# Patient Record
Sex: FEMALE | Race: WHITE
[De-identification: names, ages, dates, MRNs, and addresses within clinical notes are randomized per-mention and may not be internally consistent; named-entity substitution may affect disease eponyms.]

---

## 2018-01-15 ENCOUNTER — HOSPITAL ENCOUNTER (OUTPATIENT)
Dept: HOSPITAL 94 - GI LAB | Age: 43
Discharge: HOME | End: 2018-01-15
Attending: INTERNAL MEDICINE
Payer: MEDICAID

## 2018-01-15 VITALS — DIASTOLIC BLOOD PRESSURE: 105 MMHG | SYSTOLIC BLOOD PRESSURE: 143 MMHG

## 2018-01-15 VITALS — DIASTOLIC BLOOD PRESSURE: 94 MMHG | SYSTOLIC BLOOD PRESSURE: 138 MMHG

## 2018-01-15 VITALS — DIASTOLIC BLOOD PRESSURE: 84 MMHG | SYSTOLIC BLOOD PRESSURE: 147 MMHG

## 2018-01-15 VITALS — HEIGHT: 69 IN | WEIGHT: 293 LBS | BODY MASS INDEX: 43.4 KG/M2

## 2018-01-15 VITALS — DIASTOLIC BLOOD PRESSURE: 75 MMHG | SYSTOLIC BLOOD PRESSURE: 148 MMHG

## 2018-01-15 VITALS — DIASTOLIC BLOOD PRESSURE: 75 MMHG | SYSTOLIC BLOOD PRESSURE: 135 MMHG

## 2018-01-15 DIAGNOSIS — E66.01: Primary | ICD-10-CM

## 2018-01-15 DIAGNOSIS — Z79.899: ICD-10-CM

## 2018-01-15 DIAGNOSIS — K29.70: ICD-10-CM

## 2018-01-15 DIAGNOSIS — K20.9: ICD-10-CM

## 2018-01-15 PROCEDURE — 99152 MOD SED SAME PHYS/QHP 5/>YRS: CPT

## 2018-01-15 PROCEDURE — 43239 EGD BIOPSY SINGLE/MULTIPLE: CPT

## 2019-01-02 ENCOUNTER — HOSPITAL ENCOUNTER (EMERGENCY)
Dept: HOSPITAL 94 - ER | Age: 44
Discharge: HOME | End: 2019-01-02
Payer: MEDICAID

## 2019-01-02 VITALS — HEIGHT: 68 IN | WEIGHT: 293 LBS | BODY MASS INDEX: 44.41 KG/M2

## 2019-01-02 VITALS — SYSTOLIC BLOOD PRESSURE: 155 MMHG | DIASTOLIC BLOOD PRESSURE: 102 MMHG

## 2019-01-02 DIAGNOSIS — Z79.899: ICD-10-CM

## 2019-01-02 DIAGNOSIS — J06.9: Primary | ICD-10-CM

## 2019-01-02 DIAGNOSIS — G89.29: ICD-10-CM

## 2019-01-02 LAB
ALBUMIN SERPL BCP-MCNC: 3.6 G/DL (ref 3.4–5)
ALBUMIN/GLOB SERPL: 0.8 {RATIO} (ref 1.1–1.5)
ALP SERPL-CCNC: 91 IU/L (ref 46–116)
ALT SERPL W P-5'-P-CCNC: 26 U/L (ref 12–78)
ANION GAP SERPL CALCULATED.3IONS-SCNC: 12 MMOL/L (ref 8–16)
ANISOCYTOSIS BLD QL SMEAR: (no result)
AST SERPL W P-5'-P-CCNC: 17 U/L (ref 10–37)
BASOPHILS # BLD AUTO: 0.1 X10'3 (ref 0–0.2)
BASOPHILS NFR BLD AUTO: 0.5 % (ref 0–1)
BILIRUB SERPL-MCNC: 0.3 MG/DL (ref 0.1–1)
BUN SERPL-MCNC: 12 MG/DL (ref 7–18)
BUN/CREAT SERPL: 13.5 (ref 6.6–38)
CALCIUM SERPL-MCNC: 8.8 MG/DL (ref 8.5–10.1)
CHLORIDE SERPL-SCNC: 101 MMOL/L (ref 99–107)
CO2 SERPL-SCNC: 25.3 MMOL/L (ref 24–32)
CREAT SERPL-MCNC: 0.89 MG/DL (ref 0.4–0.9)
DACRYOCYTES BLD QL SMEAR: (no result)
EOSINOPHIL # BLD AUTO: 0.2 X10'3 (ref 0–0.9)
EOSINOPHIL NFR BLD AUTO: 2 % (ref 0–6)
ERYTHROCYTE [DISTWIDTH] IN BLOOD BY AUTOMATED COUNT: 15.6 % (ref 11.5–14.5)
GFR SERPL CREATININE-BSD FRML MDRD: 70 ML/MIN
GLUCOSE SERPL-MCNC: 117 MG/DL (ref 70–104)
HCT VFR BLD AUTO: 34.6 % (ref 35–45)
HGB BLD-MCNC: 10.7 G/DL (ref 12–16)
LYMPHOCYTES # BLD AUTO: 2.7 X10'3 (ref 1.1–4.8)
LYMPHOCYTES NFR BLD AUTO: 26.1 % (ref 21–51)
MCH RBC QN AUTO: 21.3 PG (ref 27–31)
MCHC RBC AUTO-ENTMCNC: 31 % (ref 33–36.5)
MCV RBC AUTO: 68.8 FL (ref 78–98)
MICROCYTES BLD QL SMEAR: (no result)
MONOCYTES # BLD AUTO: 0.7 X10'3 (ref 0–0.9)
MONOCYTES NFR BLD AUTO: 6.9 % (ref 2–12)
NEUTROPHILS # BLD AUTO: 6.7 X10'3 (ref 1.8–7.7)
NEUTROPHILS NFR BLD AUTO: 64.5 % (ref 42–75)
OVALOCYTES BLD QL SMEAR: (no result)
PLATELET # BLD AUTO: 261 X10'3 (ref 140–440)
PLATELET BLD QL SMEAR: NORMAL
PMV BLD AUTO: 8.7 FL (ref 7.4–10.4)
POLYCHROMASIA BLD QL SMEAR: (no result)
POTASSIUM SERPL-SCNC: 3.9 MMOL/L (ref 3.5–5.1)
PROT SERPL-MCNC: 8.1 G/DL (ref 6.4–8.2)
RBC # BLD AUTO: 5.03 X10'6 (ref 4.2–5.6)
RBC MORPH BLD: (no result)
SCHISTOCYTES BLD QL SMEAR: (no result)
SODIUM SERPL-SCNC: 138 MMOL/L (ref 135–145)
WBC # BLD AUTO: 10.4 X10'3 (ref 4.5–11)

## 2019-01-02 PROCEDURE — 99284 EMERGENCY DEPT VISIT MOD MDM: CPT

## 2019-01-02 PROCEDURE — 87040 BLOOD CULTURE FOR BACTERIA: CPT

## 2019-01-02 PROCEDURE — 83605 ASSAY OF LACTIC ACID: CPT

## 2019-01-02 PROCEDURE — 80053 COMPREHEN METABOLIC PANEL: CPT

## 2019-01-02 PROCEDURE — 87502 INFLUENZA DNA AMP PROBE: CPT

## 2019-01-02 PROCEDURE — 36415 COLL VENOUS BLD VENIPUNCTURE: CPT

## 2019-01-02 PROCEDURE — 85025 COMPLETE CBC W/AUTO DIFF WBC: CPT

## 2019-01-02 PROCEDURE — 87503 INFLUENZA DNA AMP PROB ADDL: CPT

## 2019-01-02 PROCEDURE — 71046 X-RAY EXAM CHEST 2 VIEWS: CPT

## 2019-01-02 PROCEDURE — 93005 ELECTROCARDIOGRAM TRACING: CPT

## 2019-01-06 ENCOUNTER — HOSPITAL ENCOUNTER (INPATIENT)
Dept: HOSPITAL 94 - ER | Age: 44
LOS: 3 days | Discharge: HOME | End: 2019-01-09
Payer: MEDICAID

## 2019-08-13 ENCOUNTER — HOSPITAL ENCOUNTER (OUTPATIENT)
Dept: HOSPITAL 94 - PRE-OP | Age: 44
Discharge: HOME | End: 2019-08-13
Attending: SPECIALIST
Payer: MEDICAID

## 2019-08-13 DIAGNOSIS — Z01.818: Primary | ICD-10-CM

## 2019-08-13 DIAGNOSIS — D25.9: ICD-10-CM

## 2019-08-13 DIAGNOSIS — I25.2: ICD-10-CM

## 2019-08-13 PROCEDURE — 71046 X-RAY EXAM CHEST 2 VIEWS: CPT

## 2019-08-13 PROCEDURE — 93005 ELECTROCARDIOGRAM TRACING: CPT

## 2019-08-30 ENCOUNTER — HOSPITAL ENCOUNTER (OUTPATIENT)
Dept: HOSPITAL 94 - SSTAY O | Age: 44
Discharge: HOME | End: 2019-08-30
Attending: INTERNAL MEDICINE
Payer: MEDICAID

## 2019-08-30 VITALS — DIASTOLIC BLOOD PRESSURE: 67 MMHG | SYSTOLIC BLOOD PRESSURE: 139 MMHG

## 2019-08-30 VITALS — BODY MASS INDEX: 42.12 KG/M2 | HEIGHT: 69 IN | WEIGHT: 284.4 LBS

## 2019-08-30 VITALS — SYSTOLIC BLOOD PRESSURE: 161 MMHG | DIASTOLIC BLOOD PRESSURE: 76 MMHG

## 2019-08-30 VITALS — SYSTOLIC BLOOD PRESSURE: 168 MMHG | DIASTOLIC BLOOD PRESSURE: 97 MMHG

## 2019-08-30 VITALS — SYSTOLIC BLOOD PRESSURE: 136 MMHG | DIASTOLIC BLOOD PRESSURE: 77 MMHG

## 2019-08-30 VITALS — SYSTOLIC BLOOD PRESSURE: 146 MMHG | DIASTOLIC BLOOD PRESSURE: 52 MMHG

## 2019-08-30 VITALS — SYSTOLIC BLOOD PRESSURE: 152 MMHG | DIASTOLIC BLOOD PRESSURE: 81 MMHG

## 2019-08-30 VITALS — DIASTOLIC BLOOD PRESSURE: 97 MMHG | SYSTOLIC BLOOD PRESSURE: 172 MMHG

## 2019-08-30 VITALS — SYSTOLIC BLOOD PRESSURE: 152 MMHG | DIASTOLIC BLOOD PRESSURE: 99 MMHG

## 2019-08-30 VITALS — SYSTOLIC BLOOD PRESSURE: 171 MMHG | DIASTOLIC BLOOD PRESSURE: 95 MMHG

## 2019-08-30 DIAGNOSIS — F41.9: ICD-10-CM

## 2019-08-30 DIAGNOSIS — M19.90: ICD-10-CM

## 2019-08-30 DIAGNOSIS — Z82.49: ICD-10-CM

## 2019-08-30 DIAGNOSIS — Z87.11: ICD-10-CM

## 2019-08-30 DIAGNOSIS — Z98.890: ICD-10-CM

## 2019-08-30 DIAGNOSIS — Z79.899: ICD-10-CM

## 2019-08-30 DIAGNOSIS — E66.01: ICD-10-CM

## 2019-08-30 DIAGNOSIS — Z86.711: ICD-10-CM

## 2019-08-30 DIAGNOSIS — I25.10: Primary | ICD-10-CM

## 2019-08-30 DIAGNOSIS — D50.9: ICD-10-CM

## 2019-08-30 DIAGNOSIS — F32.9: ICD-10-CM

## 2019-08-30 LAB
ALBUMIN SERPL BCP-MCNC: 3.8 G/DL (ref 3.4–5)
ANION GAP SERPL CALCULATED.3IONS-SCNC: 9 MMOL/L (ref 8–16)
ANISOCYTOSIS BLD QL SMEAR: (no result)
BASOPHILS # BLD AUTO: 0.1 X10'3 (ref 0–0.2)
BASOPHILS NFR BLD AUTO: 0.5 % (ref 0–1)
BUN SERPL-MCNC: 10 MG/DL (ref 7–18)
BUN/CREAT SERPL: 12.5 (ref 6.6–38)
CALCIUM SERPL-MCNC: 9.2 MG/DL (ref 8.5–10.1)
CHLORIDE SERPL-SCNC: 105 MMOL/L (ref 99–107)
CO2 SERPL-SCNC: 26.7 MMOL/L (ref 24–32)
CREAT SERPL-MCNC: 0.8 MG/DL (ref 0.4–0.9)
EOSINOPHIL # BLD AUTO: 0.1 X10'3 (ref 0–0.9)
EOSINOPHIL NFR BLD AUTO: 1.1 % (ref 0–6)
ERYTHROCYTE [DISTWIDTH] IN BLOOD BY AUTOMATED COUNT: 18.1 % (ref 11.5–14.5)
GFR SERPL CREATININE-BSD FRML MDRD: 78 ML/MIN
GLUCOSE SERPL-MCNC: 92 MG/DL (ref 70–104)
HCT VFR BLD AUTO: 31.3 % (ref 35–45)
HGB BLD-MCNC: 9.3 G/DL (ref 12–16)
HYPOCHROMIA BLD QL SMEAR: (no result)
LYMPHOCYTES # BLD AUTO: 3 X10'3 (ref 1.1–4.8)
LYMPHOCYTES NFR BLD AUTO: 27.9 % (ref 21–51)
MAGNESIUM SERPL-MCNC: 2 MG/DL (ref 1.5–2.4)
MCH RBC QN AUTO: 19.2 PG (ref 27–31)
MCHC RBC AUTO-ENTMCNC: 29.8 G/DL (ref 33–36.5)
MCV RBC AUTO: 64.4 FL (ref 78–98)
MICROCYTES BLD QL SMEAR: (no result)
MONOCYTES # BLD AUTO: 0.9 X10'3 (ref 0–0.9)
MONOCYTES NFR BLD AUTO: 8 % (ref 2–12)
NEUTROPHILS # BLD AUTO: 6.7 X10'3 (ref 1.8–7.7)
NEUTROPHILS NFR BLD AUTO: 62.5 % (ref 42–75)
PLATELET # BLD AUTO: 331 X10'3 (ref 140–440)
PLATELET BLD QL SMEAR: NORMAL
PMV BLD AUTO: 8.4 FL (ref 7.4–10.4)
POTASSIUM SERPL-SCNC: 4.2 MMOL/L (ref 3.5–5.1)
RBC # BLD AUTO: 4.86 X10'6 (ref 4.2–5.6)
RBC MORPH BLD: (no result)
SCHISTOCYTES BLD QL SMEAR: (no result)
SODIUM SERPL-SCNC: 141 MMOL/L (ref 135–145)
WBC # BLD AUTO: 10.7 X10'3 (ref 4.5–11)

## 2019-08-30 PROCEDURE — 93458 L HRT ARTERY/VENTRICLE ANGIO: CPT

## 2019-08-30 PROCEDURE — 80048 BASIC METABOLIC PNL TOTAL CA: CPT

## 2019-08-30 PROCEDURE — 83735 ASSAY OF MAGNESIUM: CPT

## 2019-08-30 PROCEDURE — 99153 MOD SED SAME PHYS/QHP EA: CPT

## 2019-08-30 PROCEDURE — 93005 ELECTROCARDIOGRAM TRACING: CPT

## 2019-08-30 PROCEDURE — 85025 COMPLETE CBC W/AUTO DIFF WBC: CPT

## 2019-08-30 PROCEDURE — 99152 MOD SED SAME PHYS/QHP 5/>YRS: CPT

## 2019-08-30 PROCEDURE — 85610 PROTHROMBIN TIME: CPT

## 2019-08-30 PROCEDURE — 36415 COLL VENOUS BLD VENIPUNCTURE: CPT

## 2019-08-31 ENCOUNTER — HOSPITAL ENCOUNTER (INPATIENT)
Dept: HOSPITAL 94 - ER | Age: 44
LOS: 1 days | DRG: 190 | End: 2019-09-01
Attending: INTERNAL MEDICINE | Admitting: INTERNAL MEDICINE
Payer: MEDICAID

## 2019-08-31 VITALS — SYSTOLIC BLOOD PRESSURE: 102 MMHG | DIASTOLIC BLOOD PRESSURE: 58 MMHG

## 2019-08-31 VITALS — DIASTOLIC BLOOD PRESSURE: 51 MMHG | SYSTOLIC BLOOD PRESSURE: 98 MMHG

## 2019-08-31 VITALS — DIASTOLIC BLOOD PRESSURE: 55 MMHG | SYSTOLIC BLOOD PRESSURE: 104 MMHG

## 2019-08-31 VITALS — SYSTOLIC BLOOD PRESSURE: 111 MMHG | DIASTOLIC BLOOD PRESSURE: 57 MMHG

## 2019-08-31 VITALS — DIASTOLIC BLOOD PRESSURE: 44 MMHG | SYSTOLIC BLOOD PRESSURE: 73 MMHG

## 2019-08-31 VITALS — DIASTOLIC BLOOD PRESSURE: 53 MMHG | SYSTOLIC BLOOD PRESSURE: 99 MMHG

## 2019-08-31 VITALS — BODY MASS INDEX: 47.09 KG/M2 | HEIGHT: 66 IN | WEIGHT: 293 LBS

## 2019-08-31 VITALS — SYSTOLIC BLOOD PRESSURE: 99 MMHG | DIASTOLIC BLOOD PRESSURE: 53 MMHG

## 2019-08-31 VITALS — SYSTOLIC BLOOD PRESSURE: 107 MMHG | DIASTOLIC BLOOD PRESSURE: 56 MMHG

## 2019-08-31 VITALS — DIASTOLIC BLOOD PRESSURE: 55 MMHG | SYSTOLIC BLOOD PRESSURE: 106 MMHG

## 2019-08-31 VITALS — SYSTOLIC BLOOD PRESSURE: 82 MMHG | DIASTOLIC BLOOD PRESSURE: 41 MMHG

## 2019-08-31 VITALS — SYSTOLIC BLOOD PRESSURE: 112 MMHG | DIASTOLIC BLOOD PRESSURE: 59 MMHG

## 2019-08-31 VITALS — SYSTOLIC BLOOD PRESSURE: 96 MMHG | DIASTOLIC BLOOD PRESSURE: 47 MMHG

## 2019-08-31 VITALS — DIASTOLIC BLOOD PRESSURE: 54 MMHG | SYSTOLIC BLOOD PRESSURE: 106 MMHG

## 2019-08-31 VITALS — SYSTOLIC BLOOD PRESSURE: 90 MMHG | DIASTOLIC BLOOD PRESSURE: 52 MMHG

## 2019-08-31 VITALS — DIASTOLIC BLOOD PRESSURE: 48 MMHG | SYSTOLIC BLOOD PRESSURE: 82 MMHG

## 2019-08-31 VITALS — DIASTOLIC BLOOD PRESSURE: 67 MMHG | SYSTOLIC BLOOD PRESSURE: 114 MMHG

## 2019-08-31 VITALS — SYSTOLIC BLOOD PRESSURE: 93 MMHG | DIASTOLIC BLOOD PRESSURE: 49 MMHG

## 2019-08-31 VITALS — DIASTOLIC BLOOD PRESSURE: 54 MMHG | SYSTOLIC BLOOD PRESSURE: 104 MMHG

## 2019-08-31 VITALS — DIASTOLIC BLOOD PRESSURE: 53 MMHG | SYSTOLIC BLOOD PRESSURE: 97 MMHG

## 2019-08-31 VITALS — SYSTOLIC BLOOD PRESSURE: 103 MMHG | DIASTOLIC BLOOD PRESSURE: 53 MMHG

## 2019-08-31 VITALS — SYSTOLIC BLOOD PRESSURE: 106 MMHG | DIASTOLIC BLOOD PRESSURE: 56 MMHG

## 2019-08-31 VITALS — DIASTOLIC BLOOD PRESSURE: 45 MMHG | SYSTOLIC BLOOD PRESSURE: 80 MMHG

## 2019-08-31 VITALS — DIASTOLIC BLOOD PRESSURE: 53 MMHG | SYSTOLIC BLOOD PRESSURE: 80 MMHG

## 2019-08-31 DIAGNOSIS — M54.9: ICD-10-CM

## 2019-08-31 DIAGNOSIS — Z79.899: ICD-10-CM

## 2019-08-31 DIAGNOSIS — J81.0: ICD-10-CM

## 2019-08-31 DIAGNOSIS — R29.898: ICD-10-CM

## 2019-08-31 DIAGNOSIS — G89.4: ICD-10-CM

## 2019-08-31 DIAGNOSIS — I20.1: ICD-10-CM

## 2019-08-31 DIAGNOSIS — F41.9: ICD-10-CM

## 2019-08-31 DIAGNOSIS — I42.8: ICD-10-CM

## 2019-08-31 DIAGNOSIS — I21.3: Primary | ICD-10-CM

## 2019-08-31 DIAGNOSIS — Z86.711: ICD-10-CM

## 2019-08-31 DIAGNOSIS — N17.9: ICD-10-CM

## 2019-08-31 DIAGNOSIS — J96.00: ICD-10-CM

## 2019-08-31 DIAGNOSIS — I51.81: ICD-10-CM

## 2019-08-31 DIAGNOSIS — E66.01: ICD-10-CM

## 2019-08-31 DIAGNOSIS — R57.0: ICD-10-CM

## 2019-08-31 LAB
ALBUMIN SERPL BCP-MCNC: 3 G/DL (ref 3.4–5)
ALBUMIN SERPL BCP-MCNC: 3 G/DL (ref 3.4–5)
ALBUMIN SERPL BCP-MCNC: 3.4 G/DL (ref 3.4–5)
ALBUMIN/GLOB SERPL: 0.7 {RATIO} (ref 1.1–1.5)
ALBUMIN/GLOB SERPL: 0.8 {RATIO} (ref 1.1–1.5)
ALBUMIN/GLOB SERPL: 0.8 {RATIO} (ref 1.1–1.5)
ALP SERPL-CCNC: 67 IU/L (ref 46–116)
ALP SERPL-CCNC: 73 IU/L (ref 46–116)
ALP SERPL-CCNC: 86 IU/L (ref 46–116)
ALT SERPL W P-5'-P-CCNC: 25 U/L (ref 12–78)
ALT SERPL W P-5'-P-CCNC: 44 U/L (ref 12–78)
ALT SERPL W P-5'-P-CCNC: 68 U/L (ref 12–78)
ANION GAP SERPL CALCULATED.3IONS-SCNC: 11 MMOL/L (ref 8–16)
ANION GAP SERPL CALCULATED.3IONS-SCNC: 13 MMOL/L (ref 8–16)
ANION GAP SERPL CALCULATED.3IONS-SCNC: 18 MMOL/L (ref 8–16)
ANISOCYTOSIS BLD QL SMEAR: (no result)
ANISOCYTOSIS BLD QL SMEAR: (no result)
APTT PPP: 24 SECONDS (ref 22–32)
APTT PPP: 27 SECONDS (ref 22–32)
AST SERPL W P-5'-P-CCNC: 12 U/L (ref 10–37)
AST SERPL W P-5'-P-CCNC: 169 U/L (ref 10–37)
AST SERPL W P-5'-P-CCNC: 496 U/L (ref 10–37)
BASE EXCESS BLDA CALC-SCNC: -13.6 MMOL/L (ref -2–3)
BASE EXCESS BLDA CALC-SCNC: -6.4 MMOL/L (ref -2–3)
BASE EXCESS BLDA CALC-SCNC: -9.9 MMOL/L (ref -2–3)
BASOPHILS # BLD AUTO: 0 X10'3 (ref 0–0.2)
BASOPHILS # BLD AUTO: 0 X10'3 (ref 0–0.2)
BASOPHILS # BLD AUTO: 0.1 X10'3 (ref 0–0.2)
BASOPHILS NFR BLD AUTO: 0.1 % (ref 0–1)
BASOPHILS NFR BLD AUTO: 0.2 % (ref 0–1)
BASOPHILS NFR BLD AUTO: 0.7 % (ref 0–1)
BILIRUB SERPL-MCNC: 0.4 MG/DL (ref 0.1–1)
BODY TEMPERATURE: 37
BODY TEMPERATURE: 37
BODY TEMPERATURE: 37.4
BUN SERPL-MCNC: 11 MG/DL (ref 7–18)
BUN SERPL-MCNC: 14 MG/DL (ref 7–18)
BUN SERPL-MCNC: 17 MG/DL (ref 7–18)
BUN/CREAT SERPL: 10.6 (ref 6.6–38)
BUN/CREAT SERPL: 7.2 (ref 6.6–38)
BUN/CREAT SERPL: 7.9 (ref 6.6–38)
CALCIUM SERPL-MCNC: 7.9 MG/DL (ref 8.5–10.1)
CALCIUM SERPL-MCNC: 8.3 MG/DL (ref 8.5–10.1)
CALCIUM SERPL-MCNC: 8.8 MG/DL (ref 8.5–10.1)
CHLORIDE SERPL-SCNC: 102 MMOL/L (ref 99–107)
CHLORIDE SERPL-SCNC: 104 MMOL/L (ref 99–107)
CHLORIDE SERPL-SCNC: 104 MMOL/L (ref 99–107)
CO2 SERPL-SCNC: 15.9 MMOL/L (ref 24–32)
CO2 SERPL-SCNC: 23.6 MMOL/L (ref 24–32)
CO2 SERPL-SCNC: 23.7 MMOL/L (ref 24–32)
COHGB MFR BLDA: 0.2 % (ref 0.5–1.5)
COHGB MFR BLDA: 0.3 % (ref 0.5–1.5)
COHGB MFR BLDA: 0.3 % (ref 0.5–1.5)
CREAT SERPL-MCNC: 1.04 MG/DL (ref 0.4–0.9)
CREAT SERPL-MCNC: 1.77 MG/DL (ref 0.4–0.9)
CREAT SERPL-MCNC: 2.35 MG/DL (ref 0.4–0.9)
D DIMER PPP FEU-MCNC: 1.09 MG/L FEU (ref 0–0.5)
EOSINOPHIL # BLD AUTO: 0 X10'3 (ref 0–0.9)
EOSINOPHIL # BLD AUTO: 0 X10'3 (ref 0–0.9)
EOSINOPHIL # BLD AUTO: 0.3 X10'3 (ref 0–0.9)
EOSINOPHIL NFR BLD AUTO: 0 % (ref 0–6)
EOSINOPHIL NFR BLD AUTO: 0.1 % (ref 0–6)
EOSINOPHIL NFR BLD AUTO: 2.1 % (ref 0–6)
ERYTHROCYTE [DISTWIDTH] IN BLOOD BY AUTOMATED COUNT: 18.3 % (ref 11.5–14.5)
ERYTHROCYTE [DISTWIDTH] IN BLOOD BY AUTOMATED COUNT: 18.7 % (ref 11.5–14.5)
ERYTHROCYTE [DISTWIDTH] IN BLOOD BY AUTOMATED COUNT: 19.1 % (ref 11.5–14.5)
GFR SERPL CREATININE-BSD FRML MDRD: 23 ML/MIN
GFR SERPL CREATININE-BSD FRML MDRD: 31 ML/MIN
GFR SERPL CREATININE-BSD FRML MDRD: 58 ML/MIN
GLUCOSE SERPL-MCNC: 183 MG/DL (ref 70–104)
GLUCOSE SERPL-MCNC: 215 MG/DL (ref 70–104)
GLUCOSE SERPL-MCNC: 271 MG/DL (ref 70–104)
HCO3 BLDA-SCNC: 16.8 MMOL/L (ref 22–26)
HCO3 BLDA-SCNC: 17.6 MMOL/L (ref 22–26)
HCO3 BLDA-SCNC: 18.5 MMOL/L (ref 22–26)
HCT VFR BLD AUTO: 30.5 % (ref 35–45)
HCT VFR BLD AUTO: 30.8 % (ref 35–45)
HCT VFR BLD AUTO: 34.4 % (ref 35–45)
HGB BLD-MCNC: 8.9 G/DL (ref 12–16)
HGB BLD-MCNC: 9 G/DL (ref 12–16)
HGB BLD-MCNC: 9.9 G/DL (ref 12–16)
HGB BLDA-MCNC: 10.7 G/DL (ref 12–16)
HGB BLDA-MCNC: 11 G/DL (ref 12–16)
HGB BLDA-MCNC: 11.8 G/DL (ref 12–16)
HYPOCHROMIA BLD QL SMEAR: (no result)
INHALED O2 FLOW RATE: 15 L/MIN
LYMPHOCYTES # BLD AUTO: 1.5 X10'3 (ref 1.1–4.8)
LYMPHOCYTES # BLD AUTO: 1.9 X10'3 (ref 1.1–4.8)
LYMPHOCYTES # BLD AUTO: 6.5 X10'3 (ref 1.1–4.8)
LYMPHOCYTES NFR BLD AUTO: 50.5 % (ref 21–51)
LYMPHOCYTES NFR BLD AUTO: 6.2 % (ref 21–51)
LYMPHOCYTES NFR BLD AUTO: 9.2 % (ref 21–51)
MCH RBC QN AUTO: 18.8 PG (ref 27–31)
MCH RBC QN AUTO: 19 PG (ref 27–31)
MCH RBC QN AUTO: 19.2 PG (ref 27–31)
MCHC RBC AUTO-ENTMCNC: 28.7 G/DL (ref 33–36.5)
MCHC RBC AUTO-ENTMCNC: 28.9 G/DL (ref 33–36.5)
MCHC RBC AUTO-ENTMCNC: 29.5 G/DL (ref 33–36.5)
MCV RBC AUTO: 64.6 FL (ref 78–98)
MCV RBC AUTO: 65.2 FL (ref 78–98)
MCV RBC AUTO: 66.8 FL (ref 78–98)
METHGB MFR BLDA: 0.3 % (ref 0.3–1.12)
MICROCYTES BLD QL SMEAR: (no result)
MICROCYTES BLD QL SMEAR: (no result)
MONOCYTES # BLD AUTO: 1.1 X10'3 (ref 0–0.9)
MONOCYTES # BLD AUTO: 1.3 X10'3 (ref 0–0.9)
MONOCYTES # BLD AUTO: 1.5 X10'3 (ref 0–0.9)
MONOCYTES NFR BLD AUTO: 5.3 % (ref 2–12)
MONOCYTES NFR BLD AUTO: 6.9 % (ref 2–12)
MONOCYTES NFR BLD AUTO: 8.3 % (ref 2–12)
NEUTROPHILS # BLD AUTO: 17.6 X10'3 (ref 1.8–7.7)
NEUTROPHILS # BLD AUTO: 21.5 X10'3 (ref 1.8–7.7)
NEUTROPHILS # BLD AUTO: 5 X10'3 (ref 1.8–7.7)
NEUTROPHILS NFR BLD AUTO: 38.4 % (ref 42–75)
NEUTROPHILS NFR BLD AUTO: 83.6 % (ref 42–75)
NEUTROPHILS NFR BLD AUTO: 88.4 % (ref 42–75)
O2/TOTAL GAS SETTING VFR VENT: 100 MMHG/%
O2/TOTAL GAS SETTING VFR VENT: 100 MMHG/%
O2/TOTAL GAS SETTING VFR VENT: 50 MMHG/%
OXYHGB MFR BLDA: 92.1 % (ref 94–100)
OXYHGB MFR BLDA: 94.3 % (ref 94–100)
OXYHGB MFR BLDA: 94.9 % (ref 94–100)
PCO2 TEMP ADJ BLDA: 34.8 MMHG (ref 35–45)
PCO2 TEMP ADJ BLDA: 39.6 MMHG (ref 35–45)
PCO2 TEMP ADJ BLDA: 67.8 MMHG (ref 35–45)
PEEP RESPIRATORY: 5 CM H2O
PEEP RESPIRATORY: 5 CM H2O
PH TEMP ADJ BLDA: 7.03 [PH] (ref 7.35–7.45)
PH TEMP ADJ BLDA: 7.25 [PH] (ref 7.35–7.45)
PH TEMP ADJ BLDA: 7.34 [PH] (ref 7.35–7.45)
PLATELET # BLD AUTO: 287 X10'3 (ref 140–440)
PLATELET # BLD AUTO: 316 X10'3 (ref 140–440)
PLATELET # BLD AUTO: 334 X10'3 (ref 140–440)
PLATELET BLD QL SMEAR: NORMAL
PLATELET BLD QL SMEAR: NORMAL
PMV BLD AUTO: 7.9 FL (ref 7.4–10.4)
PMV BLD AUTO: 7.9 FL (ref 7.4–10.4)
PMV BLD AUTO: 8.9 FL (ref 7.4–10.4)
PO2 TEMP ADJ BLD: 83.8 MMHG (ref 83–108)
PO2 TEMP ADJ BLD: 86.8 MMHG (ref 83–108)
PO2 TEMP ADJ BLD: 97.8 MMHG (ref 83–108)
PO2 TEMP ADJ BLDMV: 30.5 MMHG (ref 35–46)
POIKILOCYTOSIS BLD QL SMEAR: (no result)
POLYCHROMASIA BLD QL SMEAR: (no result)
POTASSIUM SERPL-SCNC: 3.5 MMOL/L (ref 3.5–5.1)
POTASSIUM SERPL-SCNC: 4.3 MMOL/L (ref 3.5–5.1)
POTASSIUM SERPL-SCNC: 4.8 MMOL/L (ref 3.5–5.1)
PROT SERPL-MCNC: 6.8 G/DL (ref 6.4–8.2)
PROT SERPL-MCNC: 7.2 G/DL (ref 6.4–8.2)
PROT SERPL-MCNC: 7.5 G/DL (ref 6.4–8.2)
RBC # BLD AUTO: 4.72 X10'6 (ref 4.2–5.6)
RBC # BLD AUTO: 4.73 X10'6 (ref 4.2–5.6)
RBC # BLD AUTO: 5.15 X10'6 (ref 4.2–5.6)
RBC MORPH BLD: (no result)
RBC MORPH BLD: (no result)
RESP RATE 1H: 20 B/MIN
RESP RATE 1H: 20 B/MIN
RESP RATE RESTING: 30 B/MIN
RESP RATE RESTING: 39 B/MIN
SAO2 % BLDA: 92.7 % (ref 95–98)
SAO2 % BLDA: 94.9 % (ref 95–98)
SAO2 % BLDA: 95.4 % (ref 95–98)
SAO2 % BLDMV: 45.8 % (ref 60–80)
SODIUM SERPL-SCNC: 138 MMOL/L (ref 135–145)
SODIUM SERPL-SCNC: 139 MMOL/L (ref 135–145)
SODIUM SERPL-SCNC: 139 MMOL/L (ref 135–145)
SPONT VT COMPRES GAS VOL SET UNCOR VENT: 350 ML
SPONT VT COMPRES GAS VOL SET UNCOR VENT: 484 ML
TROPONIN I SERPL-MCNC: 7.99 NG/ML (ref 0–0.05)
VOL.EXP/M/BW ON VENT: 15 L/MIN
VOL.EXP/M/BW ON VENT: 20 L/MIN
WBC # BLD AUTO: 13 X10'3 (ref 4.5–11)
WBC # BLD AUTO: 21.1 X10'3 (ref 4.5–11)
WBC # BLD AUTO: 24.3 X10'3 (ref 4.5–11)

## 2019-08-31 PROCEDURE — 85025 COMPLETE CBC W/AUTO DIFF WBC: CPT

## 2019-08-31 PROCEDURE — 85730 THROMBOPLASTIN TIME PARTIAL: CPT

## 2019-08-31 PROCEDURE — 71045 X-RAY EXAM CHEST 1 VIEW: CPT

## 2019-08-31 PROCEDURE — 96375 TX/PRO/DX INJ NEW DRUG ADDON: CPT

## 2019-08-31 PROCEDURE — 36600 WITHDRAWAL OF ARTERIAL BLOOD: CPT

## 2019-08-31 PROCEDURE — 93458 L HRT ARTERY/VENTRICLE ANGIO: CPT

## 2019-08-31 PROCEDURE — 94760 N-INVAS EAR/PLS OXIMETRY 1: CPT

## 2019-08-31 PROCEDURE — 0BH17EZ INSERTION OF ENDOTRACHEAL AIRWAY INTO TRACHEA, VIA NATURAL OR ARTIFICIAL OPENING: ICD-10-PCS | Performed by: INTERNAL MEDICINE

## 2019-08-31 PROCEDURE — 83880 ASSAY OF NATRIURETIC PEPTIDE: CPT

## 2019-08-31 PROCEDURE — 99291 CRITICAL CARE FIRST HOUR: CPT

## 2019-08-31 PROCEDURE — 94640 AIRWAY INHALATION TREATMENT: CPT

## 2019-08-31 PROCEDURE — 06HY33Z INSERTION OF INFUSION DEVICE INTO LOWER VEIN, PERCUTANEOUS APPROACH: ICD-10-PCS | Performed by: INTERNAL MEDICINE

## 2019-08-31 PROCEDURE — 36415 COLL VENOUS BLD VENIPUNCTURE: CPT

## 2019-08-31 PROCEDURE — 82330 ASSAY OF CALCIUM: CPT

## 2019-08-31 PROCEDURE — B2111ZZ FLUOROSCOPY OF MULTIPLE CORONARY ARTERIES USING LOW OSMOLAR CONTRAST: ICD-10-PCS | Performed by: INTERNAL MEDICINE

## 2019-08-31 PROCEDURE — 85018 HEMOGLOBIN: CPT

## 2019-08-31 PROCEDURE — B32T1ZZ COMPUTERIZED TOMOGRAPHY (CT SCAN) OF LEFT PULMONARY ARTERY USING LOW OSMOLAR CONTRAST: ICD-10-PCS

## 2019-08-31 PROCEDURE — B32S1ZZ COMPUTERIZED TOMOGRAPHY (CT SCAN) OF RIGHT PULMONARY ARTERY USING LOW OSMOLAR CONTRAST: ICD-10-PCS

## 2019-08-31 PROCEDURE — 99152 MOD SED SAME PHYS/QHP 5/>YRS: CPT

## 2019-08-31 PROCEDURE — B2151ZZ FLUOROSCOPY OF LEFT HEART USING LOW OSMOLAR CONTRAST: ICD-10-PCS | Performed by: INTERNAL MEDICINE

## 2019-08-31 PROCEDURE — 82803 BLOOD GASES ANY COMBINATION: CPT

## 2019-08-31 PROCEDURE — 93005 ELECTROCARDIOGRAM TRACING: CPT

## 2019-08-31 PROCEDURE — 82810 BLOOD GASES O2 SAT ONLY: CPT

## 2019-08-31 PROCEDURE — 96376 TX/PRO/DX INJ SAME DRUG ADON: CPT

## 2019-08-31 PROCEDURE — 80053 COMPREHEN METABOLIC PANEL: CPT

## 2019-08-31 PROCEDURE — 71275 CT ANGIOGRAPHY CHEST: CPT

## 2019-08-31 PROCEDURE — 04HY32Z INSERTION OF MONITORING DEVICE INTO LOWER ARTERY, PERCUTANEOUS APPROACH: ICD-10-PCS | Performed by: INTERNAL MEDICINE

## 2019-08-31 PROCEDURE — 85379 FIBRIN DEGRADATION QUANT: CPT

## 2019-08-31 PROCEDURE — 94002 VENT MGMT INPAT INIT DAY: CPT

## 2019-08-31 PROCEDURE — 84484 ASSAY OF TROPONIN QUANT: CPT

## 2019-08-31 PROCEDURE — 92950 HEART/LUNG RESUSCITATION CPR: CPT

## 2019-08-31 PROCEDURE — 5A1935Z RESPIRATORY VENTILATION, LESS THAN 24 CONSECUTIVE HOURS: ICD-10-PCS | Performed by: INTERNAL MEDICINE

## 2019-08-31 PROCEDURE — B3201ZZ COMPUTERIZED TOMOGRAPHY (CT SCAN) OF THORACIC AORTA USING LOW OSMOLAR CONTRAST: ICD-10-PCS

## 2019-08-31 PROCEDURE — 5A12012 PERFORMANCE OF CARDIAC OUTPUT, SINGLE, MANUAL: ICD-10-PCS | Performed by: INTERNAL MEDICINE

## 2019-08-31 PROCEDURE — 99153 MOD SED SAME PHYS/QHP EA: CPT

## 2019-08-31 PROCEDURE — 99292 CRITICAL CARE ADDL 30 MIN: CPT

## 2019-08-31 PROCEDURE — 85610 PROTHROMBIN TIME: CPT

## 2019-08-31 PROCEDURE — 82948 REAGENT STRIP/BLOOD GLUCOSE: CPT

## 2019-08-31 PROCEDURE — 83605 ASSAY OF LACTIC ACID: CPT

## 2019-08-31 PROCEDURE — 31500 INSERT EMERGENCY AIRWAY: CPT

## 2019-08-31 PROCEDURE — 96365 THER/PROPH/DIAG IV INF INIT: CPT

## 2019-08-31 PROCEDURE — 4A023N7 MEASUREMENT OF CARDIAC SAMPLING AND PRESSURE, LEFT HEART, PERCUTANEOUS APPROACH: ICD-10-PCS | Performed by: INTERNAL MEDICINE

## 2019-08-31 RX ADMIN — SODIUM CHLORIDE SCH MLS/HR: 9 INJECTION INTRAMUSCULAR; INTRAVENOUS; SUBCUTANEOUS at 22:11

## 2019-08-31 RX ADMIN — DOBUTAMINE IN DEXTROSE SCH MLS/HR: 200 INJECTION, SOLUTION INTRAVENOUS at 23:33

## 2019-08-31 RX ADMIN — SODIUM CHLORIDE SCH MLS/HR: 9 INJECTION INTRAMUSCULAR; INTRAVENOUS; SUBCUTANEOUS at 16:42

## 2019-08-31 RX ADMIN — IPRATROPIUM BROMIDE AND ALBUTEROL SULFATE SCH ML: .5; 3 SOLUTION RESPIRATORY (INHALATION) at 11:00

## 2019-08-31 RX ADMIN — Medication PRN MLS/HR: at 19:06

## 2019-08-31 RX ADMIN — IPRATROPIUM BROMIDE AND ALBUTEROL SULFATE SCH ML: .5; 3 SOLUTION RESPIRATORY (INHALATION) at 18:31

## 2019-08-31 RX ADMIN — IPRATROPIUM BROMIDE AND ALBUTEROL SULFATE SCH ML: .5; 3 SOLUTION RESPIRATORY (INHALATION) at 22:40

## 2019-08-31 RX ADMIN — DOBUTAMINE IN DEXTROSE SCH MLS/HR: 200 INJECTION, SOLUTION INTRAVENOUS at 20:45

## 2019-08-31 RX ADMIN — DOBUTAMINE IN DEXTROSE SCH MLS/HR: 200 INJECTION, SOLUTION INTRAVENOUS at 16:10

## 2019-08-31 NOTE — NUR
Etomidate 20mg in at 0911. Succs 100mg in at 0912. intubation started. MD is 
using the Bougie. pt is a difficult intubation. size 8 tube in.

## 2019-08-31 NOTE — NUR
pt receiving another bolus of 20mg of propofol per MD Pick.

-------------------------------------------------------------------------------

Addendum: 08/31/19 at 0956 by RWILCOX

-------------------------------------------------------------------------------

pt is still somewhat restless and not tolerating the ventilator. the RT is at 
bedside hand baging the pt.

## 2019-08-31 NOTE — NUR
PT HAS A PULSE. LOOKS LIKE A SLOW VENTRICULAR RHYTHM. TOLD DRIPS HAD STOPPED 
DURING CPR. NO MOVEMENT WITH PT NOTED AFTER CPR. BEFORE THE PT CODED THE PT WAS 
BREATHING OVER THE VENT FASTER THAN WOULD ALLOW HER TO GET GOOD BREATHS. TV WAS 
200 AND RR WAS 44. RT WAS AT BEDSIDE MANUALLY BAGGING HER. WE WERE TRYING TO 
GET HER FULLY SEDATED.

## 2019-08-31 NOTE — NUR
pt is in respiratory distress. now spitting up and O2 sat is dropping out. HR 
is 131. and O2 sat is  on a Non-rebreather. pt is in distress. this started 
after the CTA of her chest on the way back to the room. MD Laurent at bedside. 
RT at bedside. pt is being suctioned.

## 2019-08-31 NOTE — NUR
Patient in room CICU 2014. I have received report from Tam STOREY and had the opportunity to 
ask questions and assume patient care.

## 2019-08-31 NOTE — NUR
received from cath lab via bed, bagged via  % O2, monitor and RN in attendance, 
placed on ventilator, and bedside monitor.  EKG shows elevated ST in multiple leads and 
reciprocal depressions, see EKG for details.  

1300 vomited moderate amt of clear greenish stomach contents, suctioned and OG placed to 
lWS, unable to raise HOB due to bilateral IV in groin, and also due to low BP.  MD at 
bedside and updated on current IV meds and drips for BP support.

1430 family at bedside and updated on patients status by MD.

1750 Dr Loo called unit and updated, NTG turned on at 2.5 mcg, BP unable to tolerate same, 
dropped to SBP high 70's to low 80's with map of 56-58. NTG turned off.  Levophed increased

## 2019-08-31 NOTE — NUR
PT INTUBATED IN ER AT 0850. POST INTUBATION, RT'S WERE UNABLE TO VENTILATE PT WITH 
MECHANICAL VENTILATOR DUE TO FLASH PULMONARY EDEMA AND THE INABILITY TO SEDATE PT 
ADEQUATELY. PT PLACED ON TRANSPORT FOR PROCEDURE IN [facility], O2 WAS CONTINUOUSLY MONITORED. 
[facility] PT CURRENTLY STABLE ON VENT.

-------------------------------------------------------------------------------

Addendum: 08/31/19 at 1641 by Arlene Ayala RT

-------------------------------------------------------------------------------

Amended: Links added.

## 2019-08-31 NOTE — NUR
Called Dr Loo to update him on patients decreased BP and what current drips were at. New 
orders received. Instructed to also call intensivists with patients condition. Updated Rick Wilson NP at this time. New orders received. Will continue to monitor patient closely. 



2040- Dr Loo called back with further questions regarding patient. No new additional 
orders.

## 2019-08-31 NOTE — NUR
RESPIRATORY CALLED TO ER BED 5 AT 0910, PT IN FLASH PULMONARY EDEMA. DR PORRAS HAD A 
DIFFICULT TIME INTUBATING DUE TO ALL THE FLUID. PT WAS BAGGED AND SUCTIONED BY RT. PT 
INTUBATED AT 0916 WITH AN 8.0 TUBE, 23 AT THE TEETH. ATTEMPTED TO PLACE PT ON THE VENT BUT 
UNSUCCESSFUL DUE TO THE PT HAVING PULMONARY EDEMA. PT ALSO BREATHING IN THE 40'S WITH TIDAL 
VOLUMES IN -200'S DUE TO DIFFICULT TIME OBTAINING SEDATION FOR THE PT. RT BAGGED THE 
PT. PT HAD A MOMENT WHERE SHE LOST ALL PULSES AND CPR WAS INITIATED. CPR LASTED ABOUT 10 
MINUTES BEFORE ROSC WAS ACHIEVED. PT WAS THEN TRANSPORTED TO CATH LAB WHILE BEING BAGGED BY 
RT. RT HAD TO REMAIN IN CATH LAB FROM 1000 TO 1240 DUE TO PTS CRITICAL STATUS. WHILE IN CATH 
LAB PT WAS PLACED ON THE TRANSPORT VENT WITH SETTINGS OF AC PC, Pinsp 30, RR 20, PEEP +5 
FIO2 100%. PT REMAINED STABLE FOR THE DURATION. AT ONE POINT THE PTS SATS DROPPED TO 69 SO 
DR RIVERO VERBALLY AGREED TO INCREASE THE PEEP FROM +5 TO +8. PT SATS RETURNED THE THE 90'S. 
AT 1240 THE PT WAS BAGGED AND TRANSPORTED TO ROOM 2014 WHERE SHE WAS THEN PLACED ON THE 
VENTILATOR.

## 2019-09-01 VITALS — SYSTOLIC BLOOD PRESSURE: 77 MMHG | DIASTOLIC BLOOD PRESSURE: 45 MMHG

## 2019-09-01 VITALS — DIASTOLIC BLOOD PRESSURE: 44 MMHG | SYSTOLIC BLOOD PRESSURE: 77 MMHG

## 2019-09-01 LAB
BASE EXCESS BLDA CALC-SCNC: -20.8 MMOL/L (ref -2–3)
BODY TEMPERATURE: 38.6
COHGB MFR BLDA: 0.3 % (ref 0.5–1.5)
HCO3 BLDA-SCNC: 11.2 MMOL/L (ref 22–26)
HGB BLDA-MCNC: 9.4 G/DL (ref 12–16)
METHGB MFR BLDA: 0.4 % (ref 0.3–1.12)
O2/TOTAL GAS SETTING VFR VENT: 100 MMHG/%
OXYHGB MFR BLDA: 60.2 % (ref 94–100)
PCO2 TEMP ADJ BLDA: 60.5 MMHG (ref 35–45)
PEEP RESPIRATORY: 5 CM H2O
PH TEMP ADJ BLDA: 6.9 [PH] (ref 7.35–7.45)
PO2 TEMP ADJ BLD: 57.5 MMHG (ref 83–108)
RESP RATE 1H: 20 B/MIN
RESP RATE RESTING: 20 B/MIN
SAO2 % BLDA: 60.6 % (ref 95–98)
SPONT VT COMPRES GAS VOL SET UNCOR VENT: 350 ML
VOL.EXP/M/BW ON VENT: 9 L/MIN

## 2019-09-01 PROCEDURE — 5A12012 PERFORMANCE OF CARDIAC OUTPUT, SINGLE, MANUAL: ICD-10-PCS | Performed by: INTERNAL MEDICINE

## 2019-09-01 RX ADMIN — Medication PRN MLS/HR: at 00:21

## 2019-09-01 NOTE — NUR
Patient had changes in ECG, Rate was slowing and widening. Ekg done. Right after EKG 
patients heart rate dropped and BP dropped. Code blue called. 

-------------------------------------------------------------------------------

Addendum: 09/01/19 at 0407 by Colleen Lopez RN

-------------------------------------------------------------------------------

Patient coded for over 20 minutes, Dr Loo at bedside, called at 0240 by Dr Loo. See sara Jacobo Notes

## 2019-09-01 NOTE — NUR
Татьяна (Elio) here to . Sent all family contact info with mortuary. No 
patient belongings at bedside.

## 2019-09-01 NOTE — NUR
Spoke with Dr Loo , updated him regarding patients decreasing BP, CI, CO and heart rate and 
increasing need for levo. New orders received.

## 2019-09-01 NOTE — NUR
Multiple attempts made to contact the family, Messages left and attempted to called again. 
Vimal and Bora called to pick the body. 

Called Sruthi (daughter), Darnell (fiance), Oly (sister), Arsh (brother)

## 2019-09-01 NOTE — NUR
multiple calls placed to daughter, brother, stepsister and partner. Messages left for them 
to contact the CICU regarding pt.

## 2019-09-05 LAB — ARTERIAL PATENCY WRIST A: POSITIVE
